# Patient Record
Sex: FEMALE | Race: BLACK OR AFRICAN AMERICAN | Employment: UNEMPLOYED | URBAN - METROPOLITAN AREA
[De-identification: names, ages, dates, MRNs, and addresses within clinical notes are randomized per-mention and may not be internally consistent; named-entity substitution may affect disease eponyms.]

---

## 2019-05-19 ENCOUNTER — APPOINTMENT (OUTPATIENT)
Dept: GENERAL RADIOLOGY | Facility: CLINIC | Age: 34
End: 2019-05-19
Attending: PHYSICIAN ASSISTANT
Payer: MEDICAID

## 2019-05-19 ENCOUNTER — HOSPITAL ENCOUNTER (EMERGENCY)
Facility: CLINIC | Age: 34
Discharge: HOME | End: 2019-05-19
Attending: PHYSICIAN ASSISTANT | Admitting: PHYSICIAN ASSISTANT
Payer: MEDICAID

## 2019-05-19 VITALS
SYSTOLIC BLOOD PRESSURE: 117 MMHG | HEIGHT: 67 IN | TEMPERATURE: 98.4 F | WEIGHT: 165 LBS | DIASTOLIC BLOOD PRESSURE: 52 MMHG | RESPIRATION RATE: 16 BRPM | HEART RATE: 89 BPM | BODY MASS INDEX: 25.9 KG/M2 | OXYGEN SATURATION: 100 %

## 2019-05-19 DIAGNOSIS — S99.921A FOOT INJURY, RIGHT, INITIAL ENCOUNTER: ICD-10-CM

## 2019-05-19 PROCEDURE — 73630 X-RAY EXAM OF FOOT: CPT | Mod: RT

## 2019-05-19 PROCEDURE — 99284 EMERGENCY DEPT VISIT MOD MDM: CPT | Mod: 25

## 2019-05-19 PROCEDURE — 29505 APPLICATION LONG LEG SPLINT: CPT | Mod: RT

## 2019-05-19 SDOH — HEALTH STABILITY: MENTAL HEALTH: HOW OFTEN DO YOU HAVE A DRINK CONTAINING ALCOHOL?: NEVER

## 2019-05-19 ASSESSMENT — ENCOUNTER SYMPTOMS
NUMBNESS: 0
ARTHRALGIAS: 1

## 2019-05-19 ASSESSMENT — MIFFLIN-ST. JEOR: SCORE: 1491.07

## 2019-05-19 NOTE — ED AVS SNAPSHOT
Emergency Department  [address] [place] [id number]  Phone:  [phone]  Fax:  [phone]                                    Tahmina García   MRN: [id number]    Department:   Emergency Department   Date of Visit:  5/19/2019           After Visit Summary Signature Page    I have received my discharge instructions, and my questions have been answered. I have discussed any challenges I see with this plan with the nurse or doctor.    ..........................................................................................................................................  Patient/Patient Representative Signature      ..........................................................................................................................................  Patient Representative Print Name and Relationship to Patient    ..................................................               ................................................  Date                                   Time    ..........................................................................................................................................  Reviewed by Signature/Title    ...................................................              ..............................................  Date                                               Time          22EPIC Rev 08/18

## 2019-05-19 NOTE — DISCHARGE INSTRUCTIONS
There is possibly a very subtle fracture to the navicular bone.  Take Tylenol and ibuprofen for any discomfort.  Use ice and elevate the extremity.  Wear the postop shoe and follow-up with orthopedics in 1 week for recheck and re-x-ray.  Return immediately for uncontrolled pain, numbness, or any other concerns.

## 2019-05-19 NOTE — ED PROVIDER NOTES
"  History   Chief Complaint:  Foot pain    HPI   Tahmina García is a 34 year old female, who presents to the ED for evaluation of right foot pain. The patient reports holding the baby going down the stairs when she missed a step and ended up lurching forward twisting her right foot. She states most of the pain is on the medial side of the foot. The patient notes that it is hard to stand and that it causes extreme pain. The patient denies numbness, tingling, or weakness.  She denies hitting her head or sustaining any other injury.  She has not taken any medications prior to arrival.  She has no further concerns at this time.      Allergies:  No known drug allergies     Medications:    The patient is not currently taking any prescribed medications.    Past Medical History:    History reviewed.  No pertinent past medical history.    Past Surgical History:    History reviewed. No pertinent surgical history.    Family History:    History reviewed. No pertinent family history.     Social History:  Smoking status: Never  Alcohol use: Never  Marital Status:   [2]    Review of Systems   Musculoskeletal: Positive for arthralgias and gait problem.   Neurological: Negative for numbness.   All other systems reviewed and are negative.    Physical Exam     Patient Vitals for the past 24 hrs:   BP Temp Temp src Pulse Resp SpO2 Height Weight   05/19/19 1654 117/52 98.4  F (36.9  C) Oral 89 16 100 % 1.702 m (5' 7\") 74.8 kg (165 lb)     Physical Exam  General: Resting comfortably.  Alert and oriented.   Head:  The scalp, face, and head appear normal   Eyes:  Conjunctivae and sclerae are normal    CV:  DP and PT pulses intact to right foot.    Capillary refill is brisk in all digits of the right foot.  Resp:  No tachypnea.  No respiratory distress.  Normal effort.  MS:  Patient is tenderness to the base of the right first metatarsal.  There is also tenderness to the navicular bone.  No additional foot or toe tenderness.  No " medial/lateral ankle tenderness.  Patient can dorsi/plantar flex the right ankle with minimal pain.  She can wiggle the toes without difficulty.  Skin:  No swelling.  No obvious deformity.  No ecchymosis.  No lacerations or abrasions.  Neuro: Sensation intact to right foot.    Emergency Department Course   Imaging:  Radiographic findings were communicated with the patient who voiced understanding of the findings.    XR Foot, 3 views, Right:  1. Mild flattening of Boehler's angle (measuring approximately 22  degrees) could be due to angulation of the image or congenital change.  A very subtle fracture is also possible although the fracture is not  definitely seen. Recommend clinical correlation.  2. No other evidence for fracture or malalignment is identified.    Imaging independently reviewed and agree with radiologist interpretation.       Emergency Department Course:  Past medical records, nursing notes, and vitals reviewed.  1730: I performed an exam of the patient and obtained history, as documented above.  The patient was sent for a foot x-ray while in the emergency department, findings above.    1805: I spoke to radiology pertaining the patient's possible fracture.    1826: I rechecked the patient. Explained findings to patient.    Findings and plan explained to the Patient. Patient discharged home with instructions regarding supportive care, medications, and reasons to return. The importance of close follow-up was reviewed.     Impression & Plan    Medical Decision Making:  Tahmina García is a 34 year old female who presents for evaluation of a right foot injury. She states she tripped down the stairs and then caught herself, but landed awkwardly on her right foot. Since that time, she has had pain in her right foot and has been unable to bear much weight on the extremity, prompting her evaluation. CMS intact. She has pain mostly on the medial aspect on the right foot over the navicular bone and the first  metatarsal. X-ray was obtained and was negative for any obvious abnormality. There was irregularity of the Boehler's angle, although the patient does not have any calcaneal tenderness and doubt this as an acute abnormality, and is likely congential as described. Upon my review of the radiographs, I noted a subtle abnormality noted on the navicular bone, where the patient is clinically tender. I did talk with Dr. Monsalve who likely thought this was artifact, but could not rule out fracture. Therefore the patient will be treated conservatively with a walking boot and crutches. She denies hitting her head or any other injury. I believe she is safe to discharge home. I discussed ice and elevation as well as Tylenol and ibuprofen for discomfort. She will follow up with her primary care provider for recheck and x-ray to ascertain whether this is fractured or not. All questions were answered prior to discharge. The patient understands and agrees to this plan.    Diagnosis:    ICD-10-CM    1. Foot injury, right, initial encounter S99.921A      Disposition:  Discharged to home.    Tai Vasquez  5/19/2019    EMERGENCY DEPARTMENT  Scribe Disclosure:  I, Tai Vasquez, am serving as a scribe at 5:30 PM on 5/19/2019 to document services personally performed by Piper Batista PA based on my observations and the provider's statements to me.         Piper Batista PA-MICHAEL  05/19/19 2102